# Patient Record
Sex: FEMALE | URBAN - METROPOLITAN AREA
[De-identification: names, ages, dates, MRNs, and addresses within clinical notes are randomized per-mention and may not be internally consistent; named-entity substitution may affect disease eponyms.]

---

## 2019-06-04 ENCOUNTER — APPOINTMENT (RX ONLY)
Dept: URBAN - METROPOLITAN AREA CLINIC 41 | Facility: CLINIC | Age: 44
Setting detail: DERMATOLOGY
End: 2019-06-04

## 2019-06-04 DIAGNOSIS — Z41.9 ENCOUNTER FOR PROCEDURE FOR PURPOSES OTHER THAN REMEDYING HEALTH STATE, UNSPECIFIED: ICD-10-CM

## 2019-06-04 DIAGNOSIS — L81.1 CHLOASMA: ICD-10-CM

## 2019-06-04 PROBLEM — M12.9 ARTHROPATHY, UNSPECIFIED: Status: ACTIVE | Noted: 2019-06-04

## 2019-06-04 PROBLEM — F32.9 MAJOR DEPRESSIVE DISORDER, SINGLE EPISODE, UNSPECIFIED: Status: ACTIVE | Noted: 2019-06-04

## 2019-06-04 PROBLEM — F41.9 ANXIETY DISORDER, UNSPECIFIED: Status: ACTIVE | Noted: 2019-06-04

## 2019-06-04 PROCEDURE — 99202 OFFICE O/P NEW SF 15 MIN: CPT

## 2019-06-04 PROCEDURE — ? COSMETIC CONSULTATION: LASER RESURFACING

## 2019-06-04 PROCEDURE — ? COSMETIC CONSULTATION - MICRO-NEEDLING

## 2019-06-04 PROCEDURE — ? COSMETIC CONSULTATION: BOTULINUM TOXIN

## 2019-06-04 PROCEDURE — ? PRODUCT LINE (PHARMACEUTICALS)

## 2019-06-04 PROCEDURE — ? COUNSELING

## 2019-06-04 PROCEDURE — ? MEDICAL CONSULTATION: BROWN SPOTS

## 2019-06-04 PROCEDURE — ? TREATMENT REGIMEN

## 2019-06-04 ASSESSMENT — LOCATION ZONE DERM: LOCATION ZONE: FACE

## 2019-06-04 ASSESSMENT — LOCATION DETAILED DESCRIPTION DERM: LOCATION DETAILED: RIGHT MEDIAL FOREHEAD

## 2019-06-04 ASSESSMENT — LOCATION SIMPLE DESCRIPTION DERM: LOCATION SIMPLE: RIGHT FOREHEAD

## 2019-06-04 NOTE — PROCEDURE: PRODUCT LINE (PHARMACEUTICALS)
Product 24 Price (In Dollars - Numeric Only, No Special Characters Or $): 0.00
Product 1 Units: 0
Product 8 Price (In Dollars - Numeric Only, No Special Characters Or $): 50.00
Product 11 Price (In Dollars - Numeric Only, No Special Characters Or $): 45.00
Name Of Product 4: Melasma Emulsion
Product 1 Application Directions: Apply to affected areas QD- BID prn flares
Name Of Product 9: Tretinoin 0.1%
Allow Plan To Count Towards E/M Coding: Yes
Name Of Product 2: Clobetasol solution
Product 6 Application Directions: Apply affected areas QD to BID
Product 11 Application Directions: Apply to areas of vitiligo on the neck daily
Product 4 Units: 1
Name Of Product 7: Tretinoin 0.025%
Name Of Product 14: Tazorac 0.05
Product 4 Application Directions: Apply to areas of dark pigmentation once daily for 8 weeks
Name Of Product 12: Actinic Keratosis gel
Product 9 Application Directions: Apply pea size amount to face every night
Product 2 Application Directions: Apply to affected areas QD-BID
Product 7 Price (In Dollars - Numeric Only, No Special Characters Or $): 50.0
Name Of Product 5: Rosacea triple gel
Name Of Product 10: Tretinoin 0.05%
Name Of Product 3: Clobetasol Shampoo
Product 7 Application Directions: Apply to affected areas nightly. Patient can have 11 refills.
Name Of Product 1: Clobetasol cream
Product 3 Price (In Dollars - Numeric Only, No Special Characters Or $): 40.00
Product 10 Application Directions: Apply pea size amount every night
Name Of Product 15: Econazole cream
Product 5 Application Directions: Apply to affected areas once daily. Patient can have 11 refills
Name Of Product 8: Acne triple gel
Name Of Product 13: Azeloyl hydrating cream
Name Of Product 11: Tacrolimus
Product 3 Application Directions: Wash affected area of scalp once daily
Name Of Product 6: Tazarotene
Detail Level: Zone
Product 8 Application Directions: Apply a pea size amount to the face nightly. Patient can have refills for a year.

## 2019-06-04 NOTE — HPI: DISCOLORATION
How Severe Is Your Skin Discoloration?: mild
Additional History: Pt states she was applying a topical cream a couple years ago that she thinks might be the reason for her discoloration. Pt does not name of the cream

## 2019-07-12 ENCOUNTER — APPOINTMENT (RX ONLY)
Dept: URBAN - METROPOLITAN AREA CLINIC 41 | Facility: CLINIC | Age: 44
Setting detail: DERMATOLOGY
End: 2019-07-12